# Patient Record
Sex: MALE | URBAN - METROPOLITAN AREA
[De-identification: names, ages, dates, MRNs, and addresses within clinical notes are randomized per-mention and may not be internally consistent; named-entity substitution may affect disease eponyms.]

---

## 2023-01-30 ENCOUNTER — OFFICE VISIT (OUTPATIENT)
Dept: URBAN - METROPOLITAN AREA CLINIC 63 | Facility: CLINIC | Age: 58
End: 2023-01-30
Payer: COMMERCIAL

## 2023-01-30 ENCOUNTER — WEB ENCOUNTER (OUTPATIENT)
Dept: URBAN - METROPOLITAN AREA CLINIC 63 | Facility: CLINIC | Age: 58
End: 2023-01-30

## 2023-01-30 VITALS
TEMPERATURE: 97.5 F | BODY MASS INDEX: 25.2 KG/M2 | OXYGEN SATURATION: 98 % | HEIGHT: 66 IN | DIASTOLIC BLOOD PRESSURE: 80 MMHG | WEIGHT: 156.8 LBS | SYSTOLIC BLOOD PRESSURE: 120 MMHG | HEART RATE: 72 BPM

## 2023-01-30 DIAGNOSIS — I85.00 ESOPHAGEAL VARICES WITHOUT BLEEDING, UNSPECIFIED ESOPHAGEAL VARICES TYPE: ICD-10-CM

## 2023-01-30 DIAGNOSIS — D50.8 OTHER IRON DEFICIENCY ANEMIA: ICD-10-CM

## 2023-01-30 DIAGNOSIS — K70.31 ALCOHOLIC CIRRHOSIS OF LIVER WITH ASCITES: ICD-10-CM

## 2023-01-30 PROBLEM — 1082601000119104: Status: ACTIVE | Noted: 2023-01-30

## 2023-01-30 PROBLEM — 87522002: Status: ACTIVE | Noted: 2023-01-30

## 2023-01-30 PROBLEM — 420054005 ALCOHOLIC CIRRHOSIS: Status: ACTIVE | Noted: 2023-01-30

## 2023-01-30 PROBLEM — 14223005: Status: ACTIVE | Noted: 2023-01-30

## 2023-01-30 PROCEDURE — 99204 OFFICE O/P NEW MOD 45 MIN: CPT | Performed by: NURSE PRACTITIONER

## 2023-01-30 RX ORDER — SPIRONOLACTONE 100 MG/1
TABLET ORAL
Qty: 90 TABLET | Refills: 0 | Status: ACTIVE | COMMUNITY

## 2023-01-30 RX ORDER — FUROSEMIDE 40 MG/1
TABLET ORAL
Qty: 90 TABLET | Refills: 0 | Status: ACTIVE | COMMUNITY

## 2023-01-30 RX ORDER — LACTULOSE 10 G/15ML
15 ML AS NEEDED SOLUTION ORAL ONCE A DAY
Status: ACTIVE | COMMUNITY

## 2023-01-30 RX ORDER — ALPRAZOLAM 0.5 MG/1
TAKE 1 TABLET BY MOUTH TWICE DAILY TABLET ORAL
Qty: 180 EACH | Refills: 0 | Status: ACTIVE | COMMUNITY

## 2023-01-30 RX ORDER — RISANKIZUMAB-RZAA 150 MG/ML
INJECTION SUBCUTANEOUS
Qty: 1 UNSPECIFIED | Refills: 1 | Status: ACTIVE | COMMUNITY

## 2023-01-30 RX ORDER — NITROFURANTOIN MONOHYDRATE/MACROCRYSTALLINE 25; 75 MG/1; MG/1
1 CAPSULE WITH FOOD CAPSULE ORAL
Status: ACTIVE | COMMUNITY

## 2023-01-30 RX ORDER — NADOLOL 20 MG/1
TAKE 1 TABLET BY MOUTH TWICE DAILY TABLET ORAL
Qty: 180 EACH | Refills: 1 | Status: ACTIVE | COMMUNITY

## 2023-01-30 NOTE — HPI-PREVIOUS PROCEDURES
Upper endoscopy 06/15/2019  Indication GI bleed x2. Findings: None-bleeding esophageal varices, 6 bands placed.  Portal hypertension gastropathy.  Colonoscopy 2018 with removal of small polyp advised 5 year recall.

## 2023-01-30 NOTE — HPI-PREVIOUS LABS
Laboratory results 01/26/2023 CBC WBC 14.2 Hgb 11.1 HCT 32.2 Platelet 97 AFP 2.01 Ferritin 237 within normal limits CRP 7.0  Laboratory results 01/25/2023 CMP Sodium 133 Bilirubin, total 7.9  Laboratory results 01/24/2023 PT/INR 27.0/2.48  Laboratory results 12/16/2022 CBC .4 CMP Sodium 132 BUN 5 Chloride 97 Calcium 8.4 Albumin 2.8 Globulin 4.1 A/G ratio 0.7 Bilirubin, total 8.8 Alk phos 167 AST 62 PT/INR 14.6/1.5  Laboratory results 11/21/2022 Viral hepatitis screening negative for A, B, C TSH within normal limits Mitochondrial antibody negative

## 2023-01-30 NOTE — HPI-PREVIOUS IMAGING
MRI abdomen with and without contrast 01/23/2023 Impression: *  There are 2 subcentimeter arterial phase hyperenhancing lesions in the right lobe liver which also demonstrate delayed phase washout. This is the baseline examination. These are therefore categorized as LI-RADS Category 4 lesions and are therefore suspicious for small hepatocellular carcinomas. Recommend follow-up MRI in 3 months *  Simple cyst in the region of the uncinate process of the pancreas measuring 1.9 cm .Recommend imaging follow-up with contrast-enhanced MRI or pancreas-protocol CT every 6 months for up to 2 years. *  Cirrhotic liver with small ascites and mild splenomegaly *  Cholelithiasis  CT abdomen/pelvis with contrast 01/23/2022. Impression: Ascites. Cirrhosis with portal hypertension. Liver mass concerning for hepatocellular carcinoma. Gadolinium-enhanced MRI is suggested.  Portal Doppler be considered for further evaluation.  Paracentesis 01/23/2023 with 4.6 L removed Paracentesis was 01/12/2023 was 7.3 L removed   Ultrasound RUQ 11/21/2022 Impression: *  Cirrhotic morphology of liver with moderate ascites. *  Cholelithiasis with borderline gallbladder wall thickening which may be related to underlying hepatocellular dysfunction. Given the sonographic Darnell sign was negative acute cholecystitis is unlikely. If there remains strong clinical concern a nuclear medicine HIDA scan could BE obtained.  Ultrasound abdomen 08/02/2022 Indication: Elevated LFTs. Impression: 1.  Mild, diffuse fatty infiltration of the liver.  Additional splenomegaly. 2.  Multiple gallstones, the largest measuring up to 1.1 cm.  The common bile duct is slightly dilated 9 mm, and there is slight gallbladder wall thickening of 5 mm.  There is no pericholecystic fluid.  Darnell sign is negative.  Otherwise unremarkable complete abdominal ultrasound.

## 2023-01-30 NOTE — HPI-TODAY'S VISIT:
Mr. Lockhart is a pleasant 57-year-old male evaluated as a new patient with diagnosis of cirrhosis, esophageal varices and portal hypertension. Records indicate patient taking furosemide 40 mg once daily, spironolactone 100 mg once daily, lactulose, nadolol 40 mg once daily.  Today, he admits discontinuing alcohol October 2022.   Three paracentesis at most recent hospital admission, denies previous paracentesis.   He admist abdominal pain related to ascites.  Denies additional GI symptoms.  Admits ascites and jaundice.  Denies LE edema, confusion, profound confusion.   Denies history of hepatic encephalophy.  Has appointment with Dr. Wray oncology 02/08/2023.   Follow-up with ID 02/02/2023  He was hospitalized at [facility] 01/23/2023-01/26/2023 diagnosed with acute bacterial peritonitis, fluid positive for Streptococcus parasanguinous.  Hospitalized November 22 for same. Paracentesis 01/12/2023 without complication. UA suggesting infection.  MRSA identified a PCR. ID was consulted, started ceftriaxone and vancomycin. Ceftriaxone treatment x14 days. Prescribed nadolol for portal hypertension. Two subcentimeter lesions in the right lobe of the liver categorized as LI-RADS 4 suspicious for small hepatocellular carcinoma. Peritoneal fluid negative for malignant cells. Advised to have follow-up MRI in 3 months, follow-up at [place]. Was also referred to GI, oncology and ID.  History of GI bleed, EGD with banding June 2019. History of GI bleed, EGD with banding December 2018.

## 2023-02-06 ENCOUNTER — TELEPHONE ENCOUNTER (OUTPATIENT)
Dept: URBAN - METROPOLITAN AREA CLINIC 63 | Facility: CLINIC | Age: 58
End: 2023-02-06

## 2023-02-09 ENCOUNTER — TELEPHONE ENCOUNTER (OUTPATIENT)
Dept: URBAN - METROPOLITAN AREA CLINIC 7 | Facility: CLINIC | Age: 58
End: 2023-02-09

## 2023-02-09 RX ORDER — LACTULOSE 10 G/15ML
15ML SOLUTION ORAL
Qty: 900 MILLILITER | Refills: 1 | OUTPATIENT

## 2023-02-20 ENCOUNTER — TELEPHONE ENCOUNTER (OUTPATIENT)
Dept: URBAN - METROPOLITAN AREA CLINIC 63 | Facility: CLINIC | Age: 58
End: 2023-02-20

## 2023-05-05 ENCOUNTER — LAB OUTSIDE AN ENCOUNTER (OUTPATIENT)
Dept: URBAN - METROPOLITAN AREA CLINIC 63 | Facility: CLINIC | Age: 58
End: 2023-05-05

## 2023-05-05 ENCOUNTER — OFFICE VISIT (OUTPATIENT)
Dept: URBAN - METROPOLITAN AREA CLINIC 63 | Facility: CLINIC | Age: 58
End: 2023-05-05
Payer: COMMERCIAL

## 2023-05-05 VITALS
HEIGHT: 66 IN | DIASTOLIC BLOOD PRESSURE: 72 MMHG | SYSTOLIC BLOOD PRESSURE: 116 MMHG | TEMPERATURE: 97.1 F | WEIGHT: 148.8 LBS | HEART RATE: 71 BPM | BODY MASS INDEX: 23.91 KG/M2 | OXYGEN SATURATION: 99 %

## 2023-05-05 DIAGNOSIS — R18.8 OTHER ASCITES: ICD-10-CM

## 2023-05-05 DIAGNOSIS — K74.69 OTHER CIRRHOSIS OF LIVER: ICD-10-CM

## 2023-05-05 DIAGNOSIS — C22.0 LIVER CELL CARCINOMA: ICD-10-CM

## 2023-05-05 PROCEDURE — 99215 OFFICE O/P EST HI 40 MIN: CPT | Performed by: INTERNAL MEDICINE

## 2023-05-05 RX ORDER — ALPRAZOLAM 0.5 MG/1
TAKE 1 TABLET BY MOUTH TWICE DAILY TABLET ORAL
Qty: 180 EACH | Refills: 0 | Status: ACTIVE | COMMUNITY

## 2023-05-05 RX ORDER — NITROFURANTOIN MONOHYDRATE/MACROCRYSTALLINE 25; 75 MG/1; MG/1
1 CAPSULE WITH FOOD CAPSULE ORAL
Status: DISCONTINUED | COMMUNITY

## 2023-05-05 RX ORDER — RISANKIZUMAB-RZAA 150 MG/ML
INJECTION SUBCUTANEOUS
Qty: 1 UNSPECIFIED | Refills: 1 | Status: ACTIVE | COMMUNITY

## 2023-05-05 RX ORDER — NADOLOL 20 MG/1
TAKE 1 TABLET BY MOUTH TWICE DAILY TABLET ORAL
Qty: 180 EACH | Refills: 1 | Status: ACTIVE | COMMUNITY

## 2023-05-05 RX ORDER — SPIRONOLACTONE 100 MG/1
TABLET ORAL
Qty: 90 TABLET | Refills: 0 | Status: ACTIVE | COMMUNITY

## 2023-05-05 RX ORDER — POLYETHYLENE GLYCOL 3350, SODIUM CHLORIDE, SODIUM BICARBONATE, POTASSIUM CHLORIDE 420; 11.2; 5.72; 1.48 G/4L; G/4L; G/4L; G/4L
AS DIRECTED POWDER, FOR SOLUTION ORAL ONCE
Qty: 4000 | Refills: 0 | OUTPATIENT
Start: 2023-05-05 | End: 2023-05-06

## 2023-05-05 RX ORDER — FUROSEMIDE 40 MG/1
TABLET ORAL
Qty: 90 TABLET | Refills: 0 | Status: ACTIVE | COMMUNITY

## 2023-05-05 RX ORDER — LACTULOSE 10 G/15ML
15ML SOLUTION ORAL
Qty: 900 MILLILITER | Refills: 1 | Status: DISCONTINUED | COMMUNITY

## 2023-05-05 NOTE — HPI-TODAY'S VISIT:
Seen in follow-up with a history of cirrhosis possible hepatocellular carcinoma. Patient is a difficult historian at times. He has a recent diagnosis of alcoholic cirrhosis with ascites and SBP. During his evaluation at the hospital and lesions were identified in his liver, about 1.5 cm to 1 cm consistent with HCC. He has been seen by both medical and surgical oncology, they are awaiting an evaluation by transplant hepatology in [place] on the 30th of this month, if not a candidate RFA is being considered. Since discharge from hospital, his ascites has been under good control with 100 mg of Aldactone and 40 mg of Lasix. Recent creatinine and sodium are within normal limits. He states that he is feeling better with no encephalopathy. Denies any melena or rectal bleeding. He has intermittent abdominal pain, he has been using occasional NSAIDs. He is overdue for an upper endoscopy as well as a colonoscopy. He was taking lactulose but he discontinued it of late, has have been having constipation.

## 2023-05-05 NOTE — EXAM-PHYSICAL EXAM
Scleral icterus, mild to moderate ascites, scar on abdomen. No asterixis. Negative pedal edema. Significant muscle wasting

## 2023-06-01 ENCOUNTER — TELEPHONE ENCOUNTER (OUTPATIENT)
Dept: URBAN - METROPOLITAN AREA CLINIC 7 | Facility: CLINIC | Age: 58
End: 2023-06-01

## 2023-06-05 ENCOUNTER — OFFICE VISIT (OUTPATIENT)
Dept: URBAN - METROPOLITAN AREA MEDICAL CENTER 14 | Facility: MEDICAL CENTER | Age: 58
End: 2023-06-05

## 2023-06-07 ENCOUNTER — TELEPHONE ENCOUNTER (OUTPATIENT)
Dept: URBAN - METROPOLITAN AREA CLINIC 64 | Facility: CLINIC | Age: 58
End: 2023-06-07

## 2023-06-07 RX ORDER — LACTULOSE 10 G/15ML
15ML SOLUTION ORAL
Qty: 900 MILLILITER | Refills: 1
End: 2023-08-06

## 2023-06-12 ENCOUNTER — TELEPHONE ENCOUNTER (OUTPATIENT)
Dept: URBAN - METROPOLITAN AREA CLINIC 7 | Facility: CLINIC | Age: 58
End: 2023-06-12

## 2023-06-26 ENCOUNTER — OFFICE VISIT (OUTPATIENT)
Dept: URBAN - METROPOLITAN AREA CLINIC 60 | Facility: CLINIC | Age: 58
End: 2023-06-26

## 2023-07-13 ENCOUNTER — TELEPHONE ENCOUNTER (OUTPATIENT)
Dept: URBAN - METROPOLITAN AREA CLINIC 63 | Facility: CLINIC | Age: 58
End: 2023-07-13

## 2023-07-17 ENCOUNTER — OFFICE VISIT (OUTPATIENT)
Dept: URBAN - METROPOLITAN AREA CLINIC 60 | Facility: CLINIC | Age: 58
End: 2023-07-17

## 2023-07-27 ENCOUNTER — DASHBOARD ENCOUNTERS (OUTPATIENT)
Age: 58
End: 2023-07-27

## 2023-07-27 ENCOUNTER — OFFICE VISIT (OUTPATIENT)
Dept: URBAN - METROPOLITAN AREA CLINIC 60 | Facility: CLINIC | Age: 58
End: 2023-07-27
Payer: COMMERCIAL

## 2023-07-27 VITALS
HEART RATE: 84 BPM | DIASTOLIC BLOOD PRESSURE: 66 MMHG | TEMPERATURE: 98.8 F | BODY MASS INDEX: 23.78 KG/M2 | HEIGHT: 66 IN | WEIGHT: 148 LBS | OXYGEN SATURATION: 92 % | SYSTOLIC BLOOD PRESSURE: 108 MMHG | RESPIRATION RATE: 12 BRPM

## 2023-07-27 DIAGNOSIS — I85.00 ESOPHAGEAL VARICES WITHOUT BLEEDING, UNSPECIFIED ESOPHAGEAL VARICES TYPE: ICD-10-CM

## 2023-07-27 DIAGNOSIS — K70.31 ALCOHOLIC CIRRHOSIS OF LIVER WITH ASCITES: ICD-10-CM

## 2023-07-27 PROCEDURE — 99214 OFFICE O/P EST MOD 30 MIN: CPT | Performed by: NURSE PRACTITIONER

## 2023-07-27 RX ORDER — FUROSEMIDE 40 MG/1
TABLET ORAL
Qty: 90 TABLET | Refills: 0 | Status: ACTIVE | COMMUNITY

## 2023-07-27 RX ORDER — RISANKIZUMAB-RZAA 150 MG/ML
INJECTION SUBCUTANEOUS
Qty: 1 UNSPECIFIED | Refills: 1 | Status: ACTIVE | COMMUNITY

## 2023-07-27 RX ORDER — SPIRONOLACTONE 100 MG/1
TABLET ORAL
Qty: 90 TABLET | Refills: 0 | Status: ACTIVE | COMMUNITY

## 2023-07-27 RX ORDER — ALPRAZOLAM 0.5 MG/1
TAKE 1 TABLET BY MOUTH TWICE DAILY TABLET ORAL
Qty: 180 EACH | Refills: 0 | Status: ACTIVE | COMMUNITY

## 2023-07-27 NOTE — HPI-PREVIOUS LABS
Laboratory results 07/17/2023 CBC Hgb 11.5 HCT 33.8  Platelet 94 CMP Glucose 72 Albumin 2.7 Globulin, total 4.1 Alk phos 153 AST 42 Bilirubin, total 3.5 Bilirubin, direct 1.3 PT/INR 2.0/1.74  Laboratory results 07/12/2023 TIBC 190 Iron saturation 60% AFP 3.03 within normal limits Ferritin within normal limits Vitamin B12 and folate within normal limits  Laboratory results 06/06/2023 Hepatitis A, B, C negative  Laboratory results 01/26/2023 CBC WBC 14.2 Hgb 11.1 HCT 32.2 Platelet 97 AFP 2.01 Ferritin 237 within normal limits CRP 7.0  Laboratory results 01/25/2023 CMP Sodium 133 Bilirubin, total 7.9  Laboratory results 01/24/2023 PT/INR 27.0/2.48

## 2023-07-27 NOTE — HPI-PREVIOUS PROCEDURES
Upper endoscopy 06/06/2023 by Dr. Gaytan findings: 1. Mild to moderate erosive esophagitis (LA classification grade B) 2. Small/Grade 1 esophageal varices without red francine sign or bleeding stigmata 3. 2 cm hiatal hernia 4. Mild diffuse gastritis with hemorrhagic features but no active bleeding 5. Moderate portal hypertensive gastropathy 6. Intact gastric sleeve anatomy 7. Bulbar nodularities without overt duodenitis Pathology findings: Gastric mucosa with reactive changes.  H. pylori negative.  Colonoscopy 06/06/2023 by Dr. Gaytan findings: 1. Mild to moderate sigmoid diverticulosis 2. Mild internal hemorrhoids 3. Mild nonbleeding rectal varices 4. Intact right-sided side-to-side ileocolic anastomosis 5. Normal neoterminal ileum  Upper endoscopy 06/15/2019  Indication GI bleed x2. Findings: None-bleeding esophageal varices, 6 bands placed.  Portal hypertension gastropathy.  Colonoscopy 2018 with removal of small polyp advised 5 year recall.

## 2023-07-27 NOTE — HPI-TODAY'S VISIT:
Mr. Lockhart is a pleasant 58-year-old male with history of alcoholic cirrhosis, liver mass, followed by oncology, evaluated in follow-up of multiple hospitalizations. Previously evaluated by Dr. Rose 05/05/2023, taking 100 mg of Aldactone and 40 mg of Lasix.  Concern for hepatocellular carcinoma, AFP was normal, lesions identified on MRI classic for hematoma. Was having constipation, concern for encephalopathic, advised to take lactulose and Xifaxan. Followed by [facility], reviewing records and working with oncology to reconsider.  Plan was if not a transplant candidate would need cryoablation.  Additionally, to follow-up with [facility] Upper endoscopy and colonoscopy were ordered, but not completed.  Today, he has planned follow-up at [facility] Aug 6-10 for liver transplant evaluation.  Admits mild bilateral LE edema, mild ascites.  Denies confusion/jaundice.  Taking above diuretic regimen, Xifaxan.  Having one BM daily.    He was hospitalized in June and July 2023, most recently with complaint of recurrent ascites, weakness, failure to thrive. GI was consulted for consideration of PEG tube, however due to recurrent ascites this was not pursued.  Was having some dysphagia and aspiration, evaluated by speech pathology.  Advised to continue mechanical soft diet and thin liquids.  He was discharged to inpatient rehab to regain strength.  History of esophageal varices and portal hypertension. Discontinued alcohol October 2022.    History of multiple paracentesis   He was hospitalized at [facility] 01/23/2023-01/26/2023 diagnosed with acute bacterial peritonitis, fluid positive for Streptococcus parasanguinous.  Hospitalized November 22 for same. Paracentesis 01/12/2023 without complication. UA suggesting infection.  MRSA identified a PCR. ID was consulted, started ceftriaxone and vancomycin. Ceftriaxone treatment x14 days. Prescribed nadolol for portal hypertension. Two subcentimeter lesions in the right lobe of the liver categorized as LI-RADS 4 suspicious for small hepatocellular carcinoma. Peritoneal fluid negative for malignant cells. Advised to have follow-up MRI in 3 months, follow-up at [place]. Was also referred to GI, oncology and ID.  History of GI bleed, EGD with banding June 2019. History of GI bleed, EGD with banding December 2018.

## 2023-07-27 NOTE — HPI-PREVIOUS IMAGING
MRI abdomen with and without contrast liver protocol 07/15/2023 IMPRESSION: *    2 arterial phase hyperenhancing lesions in the right lobe liver are again demonstrated. The smaller 8 mm lesion in segment 6 is stable. The lesion in segment 7 as increased in size from 9 mm to 14 mm. LI-RADS 5. No new lesion. *  Interval increase in size of nonenhancing cystic lesion in the pancreatic head measuring 2.1 x 1.9 x 2.1 cm (previously 1.9 x 1.1 cm). Consider endoscopic ultrasound sampling. *  Cirrhotic morphology of liver with splenomegaly and abdominal ascites compatible with portal hypertension. *  Cholelithiasis.  Paracentesis 07/14/2023 Successful ultrasound-guided paracentesis with removal of 1.4 L serous fluid.  CT abdomen/pelvis with contrast Impression: 1. Mild left pleural effusion with mild atelectasis left lower lung. 2.  Hypoenhancing lesion right lobe liver corresponds to prior reported hepatic lesion and concerning for hepatocellular carcinoma. Repeat MRI with enhanced is recommended. 3. Cirrhosis with portal hypertension. 4. Moderate abdominal ascites with slightly increased enhancement of the peritoneum, consider peritonitis. 5. Enlarging cyst uncinate process of the pancreas, recommend repeat MRI.  MRI abdomen with and without contrast 01/23/2023 Impression: *  There are 2 subcentimeter arterial phase hyperenhancing lesions in the right lobe liver which also demonstrate delayed phase washout. This is the baseline examination. These are therefore categorized as LI-RADS Category 4 lesions and are therefore suspicious for small hepatocellular carcinomas. Recommend follow-up MRI in 3 months *  Simple cyst in the region of the uncinate process of the pancreas measuring 1.9 cm .Recommend imaging follow-up with contrast-enhanced MRI or pancreas-protocol CT every 6 months for up to 2 years. *  Cirrhotic liver with small ascites and mild splenomegaly *  Cholelithiasis  CT abdomen/pelvis with contrast 01/23/2022. Impression: Ascites. Cirrhosis with portal hypertension. Liver mass concerning for hepatocellular carcinoma. Gadolinium-enhanced MRI is suggested.  Portal Doppler be considered for further evaluation.  Paracentesis 01/23/2023 with 4.6 L removed Paracentesis was 01/12/2023 was 7.3 L removed   Ultrasound RUQ 11/21/2022 Impression: *  Cirrhotic morphology of liver with moderate ascites. *  Cholelithiasis with borderline gallbladder wall thickening which may be related to underlying hepatocellular dysfunction. Given the sonographic Darnell sign was negative acute cholecystitis is unlikely. If there remains strong clinical concern a nuclear medicine HIDA scan could BE obtained.  Ultrasound abdomen 08/02/2022 Indication: Elevated LFTs. Impression: 1.  Mild, diffuse fatty infiltration of the liver.  Additional splenomegaly. 2.  Multiple gallstones, the largest measuring up to 1.1 cm.  The common bile duct is slightly dilated 9 mm, and there is slight gallbladder wall thickening of 5 mm.  There is no pericholecystic fluid.  Darnell sign is negative.  Otherwise unremarkable complete abdominal ultrasound.

## 2023-08-08 ENCOUNTER — LAB OUTSIDE AN ENCOUNTER (OUTPATIENT)
Dept: URBAN - METROPOLITAN AREA CLINIC 7 | Facility: CLINIC | Age: 58
End: 2023-08-08

## 2023-08-08 ENCOUNTER — TELEPHONE ENCOUNTER (OUTPATIENT)
Dept: URBAN - METROPOLITAN AREA CLINIC 7 | Facility: CLINIC | Age: 58
End: 2023-08-08

## 2023-08-10 ENCOUNTER — TELEPHONE ENCOUNTER (OUTPATIENT)
Dept: URBAN - METROPOLITAN AREA CLINIC 60 | Facility: CLINIC | Age: 58
End: 2023-08-10

## 2023-08-21 ENCOUNTER — TELEPHONE ENCOUNTER (OUTPATIENT)
Dept: URBAN - METROPOLITAN AREA CLINIC 64 | Facility: CLINIC | Age: 58
End: 2023-08-21

## 2023-08-30 ENCOUNTER — TELEPHONE ENCOUNTER (OUTPATIENT)
Dept: URBAN - METROPOLITAN AREA CLINIC 64 | Facility: CLINIC | Age: 58
End: 2023-08-30

## 2023-08-30 ENCOUNTER — LAB OUTSIDE AN ENCOUNTER (OUTPATIENT)
Dept: MEDICAL CENTER 1 | Facility: MEDICAL CENTER | Age: 58
End: 2023-08-30

## 2023-10-25 ENCOUNTER — CLAIMS CREATED FROM THE CLAIM WINDOW (OUTPATIENT)
Dept: URBAN - METROPOLITAN AREA MEDICAL CENTER 14 | Facility: MEDICAL CENTER | Age: 58
End: 2023-10-25
Payer: COMMERCIAL

## 2023-10-25 DIAGNOSIS — K92.2 ACUTE GI BLEEDING: ICD-10-CM

## 2023-10-25 DIAGNOSIS — K22.10 ESOPHAGEAL ULCER: ICD-10-CM

## 2023-10-25 DIAGNOSIS — I85.00 ESOPHAGEAL VARICES: ICD-10-CM

## 2023-10-25 DIAGNOSIS — K74.60 CIRRHOSIS: ICD-10-CM

## 2023-10-25 DIAGNOSIS — K20.90 ESOPHAGITIS: ICD-10-CM

## 2023-10-25 PROCEDURE — 99223 1ST HOSP IP/OBS HIGH 75: CPT | Performed by: INTERNAL MEDICINE

## 2023-10-25 PROCEDURE — 43239 EGD BIOPSY SINGLE/MULTIPLE: CPT | Performed by: INTERNAL MEDICINE

## 2023-10-27 ENCOUNTER — LAB OUTSIDE AN ENCOUNTER (OUTPATIENT)
Dept: URBAN - METROPOLITAN AREA CLINIC 60 | Facility: CLINIC | Age: 58
End: 2023-10-27

## 2024-01-08 ENCOUNTER — CLAIMS CREATED FROM THE CLAIM WINDOW (OUTPATIENT)
Dept: URBAN - METROPOLITAN AREA MEDICAL CENTER 14 | Facility: MEDICAL CENTER | Age: 59
End: 2024-01-08

## 2024-01-10 ENCOUNTER — CLAIMS CREATED FROM THE CLAIM WINDOW (OUTPATIENT)
Age: 59
End: 2024-01-10
Payer: COMMERCIAL

## 2024-01-10 ENCOUNTER — CLAIMS CREATED FROM THE CLAIM WINDOW (OUTPATIENT)
Dept: URBAN - METROPOLITAN AREA MEDICAL CENTER 14 | Facility: MEDICAL CENTER | Age: 59
End: 2024-01-10
Payer: COMMERCIAL

## 2024-01-10 DIAGNOSIS — K70.30 ALCOHOLIC CIRRHOSIS OF LIVER WITHOUT ASCITES: ICD-10-CM

## 2024-01-10 DIAGNOSIS — K76.82 HEPATIC ENCEPHALOPATHY: ICD-10-CM

## 2024-01-10 PROCEDURE — 99232 SBSQ HOSP IP/OBS MODERATE 35: CPT | Performed by: NURSE PRACTITIONER
